# Patient Record
Sex: FEMALE | Race: BLACK OR AFRICAN AMERICAN | NOT HISPANIC OR LATINO | Employment: FULL TIME | URBAN - METROPOLITAN AREA
[De-identification: names, ages, dates, MRNs, and addresses within clinical notes are randomized per-mention and may not be internally consistent; named-entity substitution may affect disease eponyms.]

---

## 2023-11-29 ENCOUNTER — APPOINTMENT (OUTPATIENT)
Dept: GENERAL RADIOLOGY | Facility: CLINIC | Age: 48
End: 2023-11-29
Attending: EMERGENCY MEDICINE
Payer: COMMERCIAL

## 2023-11-29 ENCOUNTER — APPOINTMENT (OUTPATIENT)
Dept: CT IMAGING | Facility: CLINIC | Age: 48
End: 2023-11-29
Attending: EMERGENCY MEDICINE

## 2023-11-29 ENCOUNTER — HOSPITAL ENCOUNTER (EMERGENCY)
Facility: CLINIC | Age: 48
Discharge: HOME | End: 2023-11-29
Attending: EMERGENCY MEDICINE | Admitting: EMERGENCY MEDICINE
Payer: COMMERCIAL

## 2023-11-29 VITALS
OXYGEN SATURATION: 95 % | SYSTOLIC BLOOD PRESSURE: 127 MMHG | BODY MASS INDEX: 25.92 KG/M2 | TEMPERATURE: 98.9 F | DIASTOLIC BLOOD PRESSURE: 80 MMHG | RESPIRATION RATE: 18 BRPM | HEIGHT: 69 IN | HEART RATE: 91 BPM | WEIGHT: 175 LBS

## 2023-11-29 DIAGNOSIS — U07.1 COVID-19 VIRUS INFECTION: ICD-10-CM

## 2023-11-29 LAB
ALBUMIN SERPL BCG-MCNC: 4.2 G/DL (ref 3.5–5.2)
ALP SERPL-CCNC: 87 U/L (ref 40–150)
ALT SERPL W P-5'-P-CCNC: 24 U/L (ref 0–50)
ANION GAP SERPL CALCULATED.3IONS-SCNC: 13 MMOL/L (ref 7–15)
AST SERPL W P-5'-P-CCNC: 21 U/L (ref 0–45)
BASOPHILS # BLD AUTO: 0 10E3/UL (ref 0–0.2)
BASOPHILS NFR BLD AUTO: 0 %
BILIRUB SERPL-MCNC: 0.5 MG/DL
BUN SERPL-MCNC: 16.8 MG/DL (ref 6–20)
CALCIUM SERPL-MCNC: 8.7 MG/DL (ref 8.6–10)
CHLORIDE SERPL-SCNC: 100 MMOL/L (ref 98–107)
CREAT SERPL-MCNC: 0.97 MG/DL (ref 0.51–0.95)
DEPRECATED HCO3 PLAS-SCNC: 24 MMOL/L (ref 22–29)
EGFRCR SERPLBLD CKD-EPI 2021: 71 ML/MIN/1.73M2
EOSINOPHIL # BLD AUTO: 0 10E3/UL (ref 0–0.7)
EOSINOPHIL NFR BLD AUTO: 0 %
ERYTHROCYTE [DISTWIDTH] IN BLOOD BY AUTOMATED COUNT: 15.8 % (ref 10–15)
FLUAV RNA SPEC QL NAA+PROBE: NEGATIVE
FLUBV RNA RESP QL NAA+PROBE: NEGATIVE
GLUCOSE SERPL-MCNC: 127 MG/DL (ref 70–99)
HCT VFR BLD AUTO: 39.6 % (ref 35–47)
HGB BLD-MCNC: 12.7 G/DL (ref 11.7–15.7)
IMM GRANULOCYTES # BLD: 0.1 10E3/UL
IMM GRANULOCYTES NFR BLD: 0 %
LACTATE SERPL-SCNC: 0.6 MMOL/L (ref 0.7–2)
LYMPHOCYTES # BLD AUTO: 1.1 10E3/UL (ref 0.8–5.3)
LYMPHOCYTES NFR BLD AUTO: 7 %
MCH RBC QN AUTO: 25 PG (ref 26.5–33)
MCHC RBC AUTO-ENTMCNC: 32.1 G/DL (ref 31.5–36.5)
MCV RBC AUTO: 78 FL (ref 78–100)
MONOCYTES # BLD AUTO: 0.8 10E3/UL (ref 0–1.3)
MONOCYTES NFR BLD AUTO: 5 %
NEUTROPHILS # BLD AUTO: 12.9 10E3/UL (ref 1.6–8.3)
NEUTROPHILS NFR BLD AUTO: 88 %
NRBC # BLD AUTO: 0 10E3/UL
NRBC BLD AUTO-RTO: 0 /100
PLATELET # BLD AUTO: 284 10E3/UL (ref 150–450)
POTASSIUM SERPL-SCNC: 3.6 MMOL/L (ref 3.4–5.3)
PROCALCITONIN SERPL IA-MCNC: 0.25 NG/ML
PROT SERPL-MCNC: 7.6 G/DL (ref 6.4–8.3)
RBC # BLD AUTO: 5.07 10E6/UL (ref 3.8–5.2)
RSV RNA SPEC NAA+PROBE: NEGATIVE
SARS-COV-2 RNA RESP QL NAA+PROBE: POSITIVE
SODIUM SERPL-SCNC: 137 MMOL/L (ref 135–145)
TROPONIN T SERPL HS-MCNC: 6 NG/L
WBC # BLD AUTO: 14.8 10E3/UL (ref 4–11)

## 2023-11-29 PROCEDURE — 258N000003 HC RX IP 258 OP 636: Performed by: EMERGENCY MEDICINE

## 2023-11-29 PROCEDURE — 93010 ELECTROCARDIOGRAM REPORT: CPT | Performed by: EMERGENCY MEDICINE

## 2023-11-29 PROCEDURE — 84145 PROCALCITONIN (PCT): CPT | Performed by: EMERGENCY MEDICINE

## 2023-11-29 PROCEDURE — 250N000011 HC RX IP 250 OP 636: Performed by: EMERGENCY MEDICINE

## 2023-11-29 PROCEDURE — 87637 SARSCOV2&INF A&B&RSV AMP PRB: CPT | Performed by: EMERGENCY MEDICINE

## 2023-11-29 PROCEDURE — 80053 COMPREHEN METABOLIC PANEL: CPT | Performed by: EMERGENCY MEDICINE

## 2023-11-29 PROCEDURE — 85025 COMPLETE CBC W/AUTO DIFF WBC: CPT | Performed by: EMERGENCY MEDICINE

## 2023-11-29 PROCEDURE — 83605 ASSAY OF LACTIC ACID: CPT | Performed by: EMERGENCY MEDICINE

## 2023-11-29 PROCEDURE — 93005 ELECTROCARDIOGRAM TRACING: CPT | Performed by: EMERGENCY MEDICINE

## 2023-11-29 PROCEDURE — 71046 X-RAY EXAM CHEST 2 VIEWS: CPT

## 2023-11-29 PROCEDURE — 36415 COLL VENOUS BLD VENIPUNCTURE: CPT | Performed by: EMERGENCY MEDICINE

## 2023-11-29 PROCEDURE — 84484 ASSAY OF TROPONIN QUANT: CPT | Performed by: EMERGENCY MEDICINE

## 2023-11-29 PROCEDURE — 87040 BLOOD CULTURE FOR BACTERIA: CPT | Performed by: EMERGENCY MEDICINE

## 2023-11-29 PROCEDURE — 70487 CT MAXILLOFACIAL W/DYE: CPT | Mod: 26 | Performed by: STUDENT IN AN ORGANIZED HEALTH CARE EDUCATION/TRAINING PROGRAM

## 2023-11-29 PROCEDURE — 70487 CT MAXILLOFACIAL W/DYE: CPT

## 2023-11-29 PROCEDURE — 99285 EMERGENCY DEPT VISIT HI MDM: CPT | Mod: 25 | Performed by: EMERGENCY MEDICINE

## 2023-11-29 PROCEDURE — 96361 HYDRATE IV INFUSION ADD-ON: CPT | Performed by: EMERGENCY MEDICINE

## 2023-11-29 PROCEDURE — 71046 X-RAY EXAM CHEST 2 VIEWS: CPT | Mod: 26 | Performed by: RADIOLOGY

## 2023-11-29 PROCEDURE — 96360 HYDRATION IV INFUSION INIT: CPT | Mod: 59 | Performed by: EMERGENCY MEDICINE

## 2023-11-29 RX ORDER — IOPAMIDOL 755 MG/ML
67 INJECTION, SOLUTION INTRAVASCULAR ONCE
Status: COMPLETED | OUTPATIENT
Start: 2023-11-29 | End: 2023-11-29

## 2023-11-29 RX ADMIN — SODIUM CHLORIDE 1000 ML: 9 INJECTION, SOLUTION INTRAVENOUS at 21:02

## 2023-11-29 RX ADMIN — IOPAMIDOL 67 ML: 755 INJECTION, SOLUTION INTRAVENOUS at 22:15

## 2023-11-29 ASSESSMENT — ACTIVITIES OF DAILY LIVING (ADL)
ADLS_ACUITY_SCORE: 35
ADLS_ACUITY_SCORE: 35

## 2023-11-30 LAB
ATRIAL RATE - MUSE: 94 BPM
DIASTOLIC BLOOD PRESSURE - MUSE: NORMAL MMHG
INTERPRETATION ECG - MUSE: NORMAL
P AXIS - MUSE: 41 DEGREES
PR INTERVAL - MUSE: 150 MS
QRS DURATION - MUSE: 78 MS
QT - MUSE: 362 MS
QTC - MUSE: 452 MS
R AXIS - MUSE: 18 DEGREES
SYSTOLIC BLOOD PRESSURE - MUSE: NORMAL MMHG
T AXIS - MUSE: 31 DEGREES
VENTRICULAR RATE- MUSE: 94 BPM

## 2023-11-30 NOTE — ED TRIAGE NOTES
BIBA from urgent care with flu like symptoms. [facility] and still has a few [facility] left. Yesterday started having fevers increased shortness of breath.     Triage Assessment (Adult)       Row Name 11/29/23 2012          Triage Assessment    Airway WDL WDL        Respiratory WDL    Respiratory WDL X;rhythm/pattern     Rhythm/Pattern, Respiratory shortness of breath        Skin Circulation/Temperature WDL    Skin Circulation/Temperature WDL WDL        Cardiac WDL    Cardiac WDL WDL        Peripheral/Neurovascular WDL    Peripheral Neurovascular WDL WDL        Cognitive/Neuro/Behavioral WDL    Cognitive/Neuro/Behavioral WDL WDL

## 2023-11-30 NOTE — DISCHARGE INSTRUCTIONS
You have COVID-19 virus infection.  It is possible that you initially had this infection 2 weeks ago, and it is persisting.  It is also possible that you had a different viral illness 2 weeks ago, and the COVID-19 infection is new.  It is unknown when you first contracted this.  Your work-up was negative for a bacterial infection.  Your chest x-ray was clear of pneumonia.  It showed mild dehydration.  Make sure to drink more water or electrolyte drinks.  Get plenty of rest.  If you are able to buy a home pulse oxygenation monitor, return to the emergency department if you are having persistent oxygen saturations lower than 92%.    Return to the emergency department if you are have loss of consciousness, not able to take in any fluids, significant chest pain/shortness of breath, or if you have concerns.  Follow-up with your primary care doctor via telemedicine as needed for reevaluation.

## 2023-11-30 NOTE — ED PROVIDER NOTES
ED Provider Note  [facility]      History     Chief Complaint   Patient presents with    Flu Symptoms     HPI  Jennifer Courtney is a 48 year old female with recent sinobronchitis and laryngitis on 11/22 who presents to the ED BIBA for flu-like symptoms.  She states that her symptoms initially began 2 weeks ago.  She initially began to have cough, sore throat, nasal congestion, rhinorrhea, sinus pain, body aches, joint pains.  She was traveling back and forth from here to different conferences.  She states that she has sick contacts, her family at home has the exact same symptoms as her.  She went to urgent care on 11/22 and was given prednisone and augmentin.   pt finished 5 day prednisone (20mg 2x daily) course on 11/27 and is currently on a Amoxicillin-Pot Clavulanat course for 10 days with 3 days remaining.     Pt reports fever of 103.4 earlier today.  She took Tylenol at 6pm.  Ibuprofen last this morning.  Mucinex last night.  She states that she is taking all these in addition to the abx and prednisone without improvement.  She is feeling worse.    She did get the COVID-19 vaccine this year, has not received the flu vaccine this year Yet.  She went to urgent care earlier today, who performed a workup and sent her to the ED because she was noting headache.  She was able to drink broth earlier today.  She notes decreased oral intake.      Past Medical History  No past medical history on file.  No past surgical history on file.  No current outpatient medications on file.    Allergies   Allergen Reactions    Shellfish-Derived Products      Family History  No family history on file.  Social History       Past medical history, past surgical history, medications, allergies, family history, and social history were reviewed with the patient. No additional pertinent items.      A medically appropriate review of systems was performed with pertinent positives and negatives noted in the  "HPI, and all other systems negative.    Physical Exam   BP: 97/57  Pulse: 101  Temp: 99.8  F (37.7  C)  Resp: 16  Height: 175.3 cm (5' 9\")  Weight: 79.4 kg (175 lb)  SpO2: 95 %  Physical Exam  General: No acute distress.  Appears fatigued.  HENT: Normocephalic and atraumatic.  Mild generalized tenderness throughout frontal and maxillary sinuses.  No meningismus.  Eyes: EOMI. Conjunctivae normal.   Cardiovascular: Normal rate and regular rhythm.  Normal heart sounds. No murmur heard.  Pulmonary: No respiratory distress. Normal breath sounds.   Abdominal: There is no distension. Abdomen is soft. There is no mass. There is no abdominal tenderness.   Musculoskeletal: No swelling or tenderness. Moving all extremities spontaneously.    Skin: Warm and dry  Neurological: No focal deficit present.   Mood and Affect: Mood normal.         ED Course, Procedures, & Data      Procedures            No results found for any visits on 11/29/23.  Medications - No data to display  Labs Ordered and Resulted from Time of ED Arrival to Time of ED Departure - No data to display  No orders to display          Critical care was not performed.     Medical Decision Making  The patient's presentation was of high complexity (an acute health issue posing potential threat to life or bodily function).    The patient's evaluation involved:  review of external note(s) from 3+ sources (see separate area of note for details)  review of 3+ test result(s) ordered prior to this encounter (see separate area of note for details)  ordering and/or review of 3+ test(s) in this encounter (see separate area of note for details)  independent interpretation of testing performed by another health professional (see separate area of note for details)     The patient's management necessitated low risk treatment      Assessment & Plan    Patient arrives to the ED for complaint of URI symptoms, shortness of breath, fever, generalized body aches.  On exam, HR is 101 " bpm.  She is not tachypneic or in respiratory distress.  O2 sats 95% on room air.  BP 97/57.  She is afebrile.  She appears mildly fatigued, otherwise nontoxic.    Differential diagnosis, sinusitis, bacterial pneumonia, viral pneumonia, sepsis, dehydration.  I do not suspect pulmonary embolism as she presents with symptoms of viral syndrome and she is PERC negative except for heart rate.  However when sitting at rest for couple of minutes, her heart rate does go below 90.    ECG with normal sinus rhythm HR 94 bpm.  No concerning findings for arrhythmia or ischemia.  Blood cultures obtained.  Lactic acid not elevated.  Troponin normal.  CBC with leukocytosis of 14.8.  COVID-19 PCR positive.  CT sinuses with mild mucosal thickening, no sinusitis.  Chest x-ray without evidence of pneumonia.  Procalcitonin is low decreasing likelihood for bacterial infection.  I did speak with her about other potential infectious sources, which she denies.  Patient received 1 L normal saline bolus and felt mildly improved.  Blood pressure 127/80.  She was instructed on appropriate distancing, masking, and strict return precautions.  I discussed watching out for worsening symptoms and returning to the emergency department for reevaluation if there were any concerns.  She is agreeable and discharged in stable condition.    I have reviewed the nursing notes. I have reviewed the findings, diagnosis, plan and need for follow up with the patient.    New Prescriptions    No medications on file       Final diagnoses:   None       [facility] EMERGENCY DEPARTMENT  11/29/2023         Maribell Padilla MD  12/03/23 [id number]

## 2023-12-04 LAB
BACTERIA BLD CULT: NO GROWTH
BACTERIA BLD CULT: NO GROWTH